# Patient Record
Sex: FEMALE | Race: WHITE | NOT HISPANIC OR LATINO | Employment: FULL TIME | URBAN - METROPOLITAN AREA
[De-identification: names, ages, dates, MRNs, and addresses within clinical notes are randomized per-mention and may not be internally consistent; named-entity substitution may affect disease eponyms.]

---

## 2020-11-27 ENCOUNTER — TELEMEDICINE (OUTPATIENT)
Dept: INTERNAL MEDICINE CLINIC | Facility: CLINIC | Age: 70
End: 2020-11-27
Payer: MEDICARE

## 2020-11-27 VITALS — TEMPERATURE: 99.5 F | WEIGHT: 185 LBS

## 2020-11-27 DIAGNOSIS — R09.81 NASAL CONGESTION: ICD-10-CM

## 2020-11-27 DIAGNOSIS — R09.81 NASAL CONGESTION: Primary | ICD-10-CM

## 2020-11-27 PROCEDURE — U0003 INFECTIOUS AGENT DETECTION BY NUCLEIC ACID (DNA OR RNA); SEVERE ACUTE RESPIRATORY SYNDROME CORONAVIRUS 2 (SARS-COV-2) (CORONAVIRUS DISEASE [COVID-19]), AMPLIFIED PROBE TECHNIQUE, MAKING USE OF HIGH THROUGHPUT TECHNOLOGIES AS DESCRIBED BY CMS-2020-01-R: HCPCS | Performed by: PHYSICIAN ASSISTANT

## 2020-11-27 PROCEDURE — 99442 PR PHYS/QHP TELEPHONE EVALUATION 11-20 MIN: CPT | Performed by: PHYSICIAN ASSISTANT

## 2020-11-27 RX ORDER — DULAGLUTIDE 1.5 MG/.5ML
INJECTION, SOLUTION SUBCUTANEOUS WEEKLY
COMMUNITY
Start: 2020-09-09

## 2020-11-27 RX ORDER — LORATADINE 10 MG/1
10 TABLET ORAL DAILY
Qty: 14 TABLET | Refills: 0 | Status: SHIPPED | OUTPATIENT
Start: 2020-11-27

## 2020-11-27 RX ORDER — ASPIRIN 81 MG/1
81 TABLET ORAL DAILY
COMMUNITY

## 2020-11-27 RX ORDER — LOSARTAN POTASSIUM 25 MG/1
TABLET ORAL DAILY
COMMUNITY
Start: 2020-09-16

## 2020-11-27 RX ORDER — FLUTICASONE PROPIONATE 50 MCG
1 SPRAY, SUSPENSION (ML) NASAL DAILY
Qty: 1 BOTTLE | Refills: 0 | Status: SHIPPED | OUTPATIENT
Start: 2020-11-27

## 2020-11-27 RX ORDER — ROSUVASTATIN CALCIUM 10 MG/1
TABLET, COATED ORAL DAILY
COMMUNITY
Start: 2020-09-03

## 2020-11-27 RX ORDER — GLIMEPIRIDE 4 MG/1
TABLET ORAL 2 TIMES DAILY
COMMUNITY
Start: 2020-10-26

## 2020-11-28 LAB — SARS-COV-2 RNA SPEC QL NAA+PROBE: NOT DETECTED

## 2021-03-15 ENCOUNTER — IMMUNIZATIONS (OUTPATIENT)
Dept: FAMILY MEDICINE CLINIC | Facility: HOSPITAL | Age: 71
End: 2021-03-15

## 2021-03-15 DIAGNOSIS — Z23 ENCOUNTER FOR IMMUNIZATION: Primary | ICD-10-CM

## 2021-03-15 PROCEDURE — 91300 SARS-COV-2 / COVID-19 MRNA VACCINE (PFIZER-BIONTECH) 30 MCG: CPT

## 2021-03-15 PROCEDURE — 0001A SARS-COV-2 / COVID-19 MRNA VACCINE (PFIZER-BIONTECH) 30 MCG: CPT

## 2021-04-07 ENCOUNTER — IMMUNIZATIONS (OUTPATIENT)
Dept: FAMILY MEDICINE CLINIC | Facility: HOSPITAL | Age: 71
End: 2021-04-07

## 2021-04-07 DIAGNOSIS — Z23 ENCOUNTER FOR IMMUNIZATION: Primary | ICD-10-CM

## 2021-04-07 PROCEDURE — 0002A SARS-COV-2 / COVID-19 MRNA VACCINE (PFIZER-BIONTECH) 30 MCG: CPT

## 2021-04-07 PROCEDURE — 91300 SARS-COV-2 / COVID-19 MRNA VACCINE (PFIZER-BIONTECH) 30 MCG: CPT

## 2021-12-18 ENCOUNTER — IMMUNIZATIONS (OUTPATIENT)
Dept: FAMILY MEDICINE CLINIC | Facility: HOSPITAL | Age: 71
End: 2021-12-18

## 2021-12-18 DIAGNOSIS — Z23 ENCOUNTER FOR IMMUNIZATION: Primary | ICD-10-CM

## 2021-12-18 PROCEDURE — 91300 COVID-19 PFIZER VACC 0.3 ML: CPT

## 2021-12-18 PROCEDURE — 0001A COVID-19 PFIZER VACC 0.3 ML: CPT

## 2024-05-31 ENCOUNTER — HOSPITAL ENCOUNTER (OUTPATIENT)
Dept: RADIOLOGY | Facility: HOSPITAL | Age: 74
End: 2024-05-31
Payer: MEDICARE

## 2024-05-31 ENCOUNTER — HOSPITAL ENCOUNTER (OUTPATIENT)
Dept: RADIOLOGY | Facility: HOSPITAL | Age: 74
Discharge: HOME | End: 2024-05-31
Payer: MEDICARE

## 2024-05-31 DIAGNOSIS — K21.9 GASTRO-ESOPHAGEAL REFLUX DISEASE WITHOUT ESOPHAGITIS: ICD-10-CM

## 2024-05-31 DIAGNOSIS — R13.10 DYSPHAGIA, UNSPECIFIED: ICD-10-CM

## 2024-05-31 PROCEDURE — 74240 X-RAY XM UPR GI TRC 1CNTRST: CPT

## 2024-07-19 ENCOUNTER — HOSPITAL ENCOUNTER (OUTPATIENT)
Dept: RADIOLOGY | Facility: HOSPITAL | Age: 74
Discharge: HOME | End: 2024-07-19
Payer: MEDICARE

## 2024-07-19 DIAGNOSIS — M25.562 LEFT KNEE PAIN, UNSPECIFIED CHRONICITY: ICD-10-CM

## 2024-07-19 PROCEDURE — 73562 X-RAY EXAM OF KNEE 3: CPT

## 2024-10-16 ENCOUNTER — HOSPITAL ENCOUNTER (OUTPATIENT)
Dept: NON INVASIVE DIAGNOSTICS | Facility: HOSPITAL | Age: 74
Discharge: HOME | End: 2024-10-16
Payer: MEDICARE

## 2024-10-16 DIAGNOSIS — I73.9 PERIPHERAL VASCULAR DISEASE, UNSPECIFIED (HCC): ICD-10-CM

## 2024-10-16 PROCEDURE — 93923 UPR/LXTR ART STDY 3+ LVLS: CPT

## 2024-10-16 PROCEDURE — 93925 LOWER EXTREMITY STUDY: CPT

## 2024-10-16 PROCEDURE — 93922 UPR/L XTREMITY ART 2 LEVELS: CPT | Performed by: SURGERY

## 2024-10-16 PROCEDURE — 93925 LOWER EXTREMITY STUDY: CPT | Performed by: SURGERY

## 2025-07-02 ENCOUNTER — OFFICE VISIT (OUTPATIENT)
Dept: VASCULAR SURGERY | Facility: CLINIC | Age: 75
End: 2025-07-02
Payer: MEDICARE

## 2025-07-02 VITALS
DIASTOLIC BLOOD PRESSURE: 64 MMHG | WEIGHT: 138 LBS | HEIGHT: 60 IN | SYSTOLIC BLOOD PRESSURE: 108 MMHG | BODY MASS INDEX: 27.09 KG/M2 | HEART RATE: 82 BPM | OXYGEN SATURATION: 98 % | RESPIRATION RATE: 16 BRPM

## 2025-07-02 DIAGNOSIS — I83.892 SYMPTOMATIC VARICOSE VEINS, LEFT: Primary | ICD-10-CM

## 2025-07-02 PROCEDURE — 99203 OFFICE O/P NEW LOW 30 MIN: CPT | Performed by: NURSE PRACTITIONER

## 2025-07-02 RX ORDER — TIRZEPATIDE 7.5 MG/.5ML
INJECTION, SOLUTION SUBCUTANEOUS
COMMUNITY
Start: 2025-06-11

## 2025-07-02 RX ORDER — COLCHICINE 0.6 MG/1
1 TABLET ORAL 2 TIMES DAILY
COMMUNITY
Start: 2025-04-02

## 2025-07-02 RX ORDER — DOXYCYCLINE 100 MG/1
CAPSULE ORAL 2 TIMES DAILY
COMMUNITY
Start: 2025-04-02

## 2025-07-02 NOTE — PROGRESS NOTES
"Name: Imani Aceves      : 1950      MRN: [id number]  Encounter Provider: CASSANDRA Polanco  Encounter Date: 2025   Encounter department: [facility] [place]  :  Assessment & Plan  Symptomatic varicose veins, left  75-year-old female with DM (Mounjaro), HTN, and arthritis presents with complaints of LLE symptomatic varicose veins.    - Patient reports varicose veins worsening over the last 4 years L>R.  - She describes as throbbing, heaviness, tired legs.  - LLE anterior thigh to lateral leg and calf dilated varicosity  - Palpable DP pulses bilaterally  - No significant edema, no wounds, ulcerations, or skin breakdown  - Patient also complains of left knee of which she gets \"gel\" injections  - occasional nocturnal cramping  - Patient has tried compression past without relief of symptoms however felt they were hurting her knee more.  - Denies family history of venous insufficiency or varicose veins  - History of 2 pregnancies  - Patient worked as hairdresser for many years, stopping at age 71    Plan:  -Conservative medical management with daily use of medical grade compression stockings 20-30 mmHg.  On a.m., off in p.m.  Frequent ambulation   Leg elevation at rest   Moisturizer and diligent skin care to maintain skin integrity and prevent skin breakdown  - Unilateral, LLE venous duplex with reflux measurement  -Return to office with vascular surgeon to review make further recommendations if indicated    Orders:    VAS Lower extremity venous insufficiency duplex, Single leg w/ measurements; Future    Compression Stocking        History of Present Illness   Cc: \"Imani is here today for BL (more so on the left) LE varicose veins that she's noticed since she stopped doing hair. She states she has a tired/heaviness to her legs. The veins will bulge a lot when on her feet. She used to be a  until the age of 71. She has used compression but stopped due to the pain in brought to her left " "knee which she gets injections in. \"    HPI  Imani Aceves is a 75 y.o. female who presents with complaints of LLE symptomatic varicose veins.    See above assessment and plan      History obtained from: patient    Review of Systems   Constitutional:  Positive for fatigue.   Cardiovascular:  Negative for leg swelling.   Musculoskeletal:  Negative for gait problem.   Skin:  Positive for color change. Negative for wound.   Neurological:  Positive for weakness.     Medical History Reviewed by provider this encounter:  Tobacco  Allergies  Meds  Problems  Med Hx  Surg Hx  Fam Hx     .  Past Medical History   Past Medical History[1]  Past Surgical History[2]  Family History[3]   reports that she has never smoked. She has never used smokeless tobacco.  Current Outpatient Medications   Medication Instructions    aspirin (ECOTRIN LOW STRENGTH) 81 mg, Daily    colchicine (COLCRYS) 0.6 mg tablet 1 tablet, 2 times daily    Cyanocobalamin 5000 MCG TBDP Oral, Daily    doxycycline hyclate (VIBRAMYCIN) 100 mg capsule 2 times daily    fluticasone (FLONASE) 50 mcg/act nasal spray 1 spray, Nasal, Daily    glimepiride (AMARYL) 4 mg tablet 2 times daily    loratadine (CLARITIN) 10 mg, Oral, Daily    losartan (COZAAR) 25 mg tablet Daily    metFORMIN (GLUCOPHAGE) 500 mg tablet 2 times daily    Mounjaro 7.5 MG/0.5ML SOAJ     mupirocin (BACTROBAN) 2 % ointment Topical, 2 times daily    rosuvastatin (CRESTOR) 10 MG tablet Daily    Trulicity 1.5 MG/0.5ML SOPN Weekly   Allergies[4]   Medications Ordered Prior to Encounter[5]   Social History[6]     Objective   /64 (BP Location: Left arm, Patient Position: Sitting, Cuff Size: Standard)   Pulse 82   Resp 16   Ht 5' (1.524 m)   Wt 62.6 kg (138 lb)   SpO2 98%   BMI 26.95 kg/m²      Physical Exam  Vitals and nursing note reviewed.   Constitutional:       General: She is not in acute distress.     Appearance: Normal appearance. She is normal weight.     Cardiovascular:      Rate and " Rhythm: Normal rate.      Pulses:           Dorsalis pedis pulses are 1+ on the right side and 1+ on the left side.   Pulmonary:      Effort: Pulmonary effort is normal. No respiratory distress.     Musculoskeletal:         General: Normal range of motion.      Right lower leg: No edema.      Left lower leg: No edema.     Skin:     General: Skin is warm.      Capillary Refill: Capillary refill takes less than 2 seconds.      Comments: LLE dilated VV anterior thigh to lateral knee/ calf     Neurological:      Mental Status: She is alert and oriented to person, place, and time.      Sensory: No sensory deficit.      Motor: No weakness.     Psychiatric:         Behavior: Behavior normal.                   Administrative Statements   Discussed: Instructions for management, Patient and family education, Importance of tx compliance, Impressions, Documenting in the medical record, Reviewing/placing orders in the medical record (including tests, medications, and/or procedures), and Obtaining or reviewing history  .         [1]   Past Medical History:  Diagnosis Date    Arthritis     Diabetes mellitus (HCC)     Hypertension     Obesity     Shingles    [2]   Past Surgical History:  Procedure Laterality Date     SECTION      CHOLECYSTECTOMY      TUBAL LIGATION     [3]   Family History  Problem Relation Name Age of Onset    Diabetes Mother      Colon cancer Father      Coronary artery disease Brother     [4] No Known Allergies  [5]   Current Outpatient Medications on File Prior to Visit   Medication Sig Dispense Refill    aspirin (ECOTRIN LOW STRENGTH) 81 mg EC tablet Take 81 mg by mouth in the morning.      colchicine (COLCRYS) 0.6 mg tablet Take 1 tablet by mouth in the morning and 1 tablet before bedtime.      doxycycline hyclate (VIBRAMYCIN) 100 mg capsule in the morning and before bedtime. avoid sun while taking this medication.      losartan (COZAAR) 25 mg tablet in the morning.      metFORMIN (GLUCOPHAGE) 500 mg  tablet in the morning and before bedtime.      Mounjaro 7.5 MG/0.5ML SOAJ       rosuvastatin (CRESTOR) 10 MG tablet in the morning.      Cyanocobalamin 5000 MCG TBDP Take by mouth daily      fluticasone (FLONASE) 50 mcg/act nasal spray 1 spray into each nostril daily 1 Bottle 0    glimepiride (AMARYL) 4 mg tablet 2 (two) times a day      loratadine (CLARITIN) 10 mg tablet Take 1 tablet (10 mg total) by mouth daily 14 tablet 0    mupirocin (BACTROBAN) 2 % ointment Apply topically 2 (two) times a day 22 g 0    Trulicity 1.5 MG/0.5ML SOPN once a week       No current facility-administered medications on file prior to visit.   [6]   Social History  Tobacco Use    Smoking status: Never    Smokeless tobacco: Never   Vaping Use    Vaping status: Never Used

## 2025-07-02 NOTE — ASSESSMENT & PLAN NOTE
"75-year-old female with DM (Mounjaro), HTN, and arthritis presents with complaints of LLE symptomatic varicose veins.    - Patient reports varicose veins worsening over the last 4 years L>R.  - She describes as throbbing, heaviness, tired legs.  - LLE anterior thigh to lateral leg and calf dilated varicosity  - Palpable DP pulses bilaterally  - No significant edema, no wounds, ulcerations, or skin breakdown  - Patient also complains of left knee of which she gets \"gel\" injections  - occasional nocturnal cramping  - Patient has tried compression past without relief of symptoms however felt they were hurting her knee more.  - Denies family history of venous insufficiency or varicose veins  - History of 2 pregnancies  - Patient worked as hairdresser for many years, stopping at age 71    Plan:  -Conservative medical management with daily use of medical grade compression stockings 20-30 mmHg.  On a.m., off in p.m.  Frequent ambulation   Leg elevation at rest   Moisturizer and diligent skin care to maintain skin integrity and prevent skin breakdown  - Unilateral, LLE venous duplex with reflux measurement  -Return to office with vascular surgeon to review make further recommendations if indicated    Orders:    VAS Lower extremity venous insufficiency duplex, Single leg w/ measurements; Future    Compression Stocking    "

## 2025-07-02 NOTE — PATIENT INSTRUCTIONS
Conservative medical management with daily use of medical grade compression stockings 20-30 mmHg.  On a.m., off in p.m.  Frequent ambulation   Leg elevation at rest   Moisturizer and diligent skin care to maintain skin integrity and prevent skin breakdown    Left leg venous duplex with reflux measurements    Return to office with vascular surgeon to review make further recommendations if indicated

## 2025-08-05 ENCOUNTER — HOSPITAL ENCOUNTER (OUTPATIENT)
Dept: NON INVASIVE DIAGNOSTICS | Facility: CLINIC | Age: 75
Discharge: HOME | End: 2025-08-05
Attending: NURSE PRACTITIONER
Payer: MEDICARE

## 2025-08-05 ENCOUNTER — APPOINTMENT (OUTPATIENT)
Dept: RADIOLOGY | Facility: HOSPITAL | Age: 75
End: 2025-08-05
Payer: MEDICARE

## 2025-08-05 DIAGNOSIS — I83.892 SYMPTOMATIC VARICOSE VEINS, LEFT: ICD-10-CM

## 2025-08-05 PROCEDURE — 93971 EXTREMITY STUDY: CPT | Performed by: SURGERY

## 2025-08-05 PROCEDURE — 93971 EXTREMITY STUDY: CPT
